# Patient Record
Sex: FEMALE | Race: ASIAN | NOT HISPANIC OR LATINO | URBAN - METROPOLITAN AREA
[De-identification: names, ages, dates, MRNs, and addresses within clinical notes are randomized per-mention and may not be internally consistent; named-entity substitution may affect disease eponyms.]

---

## 2020-12-30 ENCOUNTER — EMERGENCY (EMERGENCY)
Facility: HOSPITAL | Age: 20
LOS: 1 days | Discharge: HOME | End: 2020-12-30
Attending: EMERGENCY MEDICINE | Admitting: EMERGENCY MEDICINE
Payer: COMMERCIAL

## 2020-12-30 VITALS
SYSTOLIC BLOOD PRESSURE: 111 MMHG | TEMPERATURE: 98 F | HEART RATE: 95 BPM | OXYGEN SATURATION: 98 % | DIASTOLIC BLOOD PRESSURE: 55 MMHG | RESPIRATION RATE: 16 BRPM

## 2020-12-30 DIAGNOSIS — Z20.828 CONTACT WITH AND (SUSPECTED) EXPOSURE TO OTHER VIRAL COMMUNICABLE DISEASES: ICD-10-CM

## 2020-12-30 PROCEDURE — 99283 EMERGENCY DEPT VISIT LOW MDM: CPT

## 2020-12-30 NOTE — ED PROVIDER NOTE - PATIENT PORTAL LINK FT
You can access the FollowMyHealth Patient Portal offered by [facility] by registering at the following website: [email or web address]. By joining [employer]’s FollowMyHealth portal, you will also be able to view your health information using other applications (apps) compatible with our system.

## 2021-01-13 ENCOUNTER — EMERGENCY (EMERGENCY)
Facility: HOSPITAL | Age: 21
LOS: 1 days | Discharge: HOME | End: 2021-01-13
Admitting: EMERGENCY MEDICINE
Payer: COMMERCIAL

## 2021-01-13 VITALS
TEMPERATURE: 98 F | SYSTOLIC BLOOD PRESSURE: 122 MMHG | OXYGEN SATURATION: 98 % | DIASTOLIC BLOOD PRESSURE: 80 MMHG | HEART RATE: 83 BPM | RESPIRATION RATE: 16 BRPM

## 2021-01-13 DIAGNOSIS — Z20.822 CONTACT WITH AND (SUSPECTED) EXPOSURE TO COVID-19: ICD-10-CM

## 2021-01-13 PROCEDURE — 99283 EMERGENCY DEPT VISIT LOW MDM: CPT

## 2021-01-13 NOTE — ED PROVIDER NOTE - PATIENT PORTAL LINK FT
You can access the FollowMyHealth Patient Portal offered by [facility] by registering at the following website: [email or web address]. By joining [employer]’s FollowMyHealth portal, you will also be able to view your health information using other applications (apps) compatible with our system.

## 2021-01-13 NOTE — ED PROVIDER NOTE - OBJECTIVE STATEMENT
21 y/o female presents to the ED requesting COVID-19 testing. Patient is currently asymptomatic and has no other medical complaints at this time. Denies fever, chills, chest pain, SOB, cough. 21 y/o female presents to the ED requesting COVID-19 testing. Patient is currently asymptomatic and has no other medical complaints at this time. Denies fever, chills, chest pain, SOB, cough. Recently returned from [place].

## 2021-01-21 ENCOUNTER — EMERGENCY (EMERGENCY)
Facility: HOSPITAL | Age: 21
LOS: 1 days | Discharge: HOME | End: 2021-01-21
Attending: EMERGENCY MEDICINE | Admitting: EMERGENCY MEDICINE
Payer: COMMERCIAL

## 2021-01-21 VITALS
HEART RATE: 80 BPM | RESPIRATION RATE: 16 BRPM | DIASTOLIC BLOOD PRESSURE: 62 MMHG | OXYGEN SATURATION: 98 % | SYSTOLIC BLOOD PRESSURE: 134 MMHG | TEMPERATURE: 98 F

## 2021-01-21 DIAGNOSIS — Z20.822 CONTACT WITH AND (SUSPECTED) EXPOSURE TO COVID-19: ICD-10-CM

## 2021-01-21 PROCEDURE — 99283 EMERGENCY DEPT VISIT LOW MDM: CPT

## 2021-01-21 NOTE — ED PROVIDER NOTE - PATIENT PORTAL LINK FT
You can access the FollowMyHealth Patient Portal offered by [facility] by registering at the following website: [email or web address]. By joining [employer]’s FollowMyHealth portal, you will also be able to view your health information using other applications (apps) compatible with our system.

## 2021-01-21 NOTE — ED PROVIDER NOTE - OBJECTIVE STATEMENT
Patient presented requesting covid-19 testing. Patient asymptomatic - denies chest pain, dyspnea, fever, cough. denies recent travel or known exposure to covid-19 Patient presented requesting covid-19 testing. Pt reports having a + exposure, her boyfriend recently tested + for covid-19. Patient is asymptomatic - denies chest pain, dyspnea, fever, cough.

## 2021-01-21 NOTE — ED PROVIDER NOTE - NSFOLLOWUPINSTRUCTIONS_ED_ALL_ED_FT
Your test results may take 1-3 days. You will get a text/email.  Please check the patient online portal ([facility] and website) for results. You can create a portal account at [email or web address] [facility] [place]. If you have old records with [employer] or [facility]  or encounter any difficulties with us you will need to call the HELP line to merge results [phone] (Mon-Fri 8a-5p).    Please follow the instructions on provided coronavirus discharge educational forms and if needed self quarantine for 14 days.     If you test positive for COVID 19:    1. STAY HOME for 14 DAYS  2. Minimize Human contact to ONLY ESSENTIAL  3. Every time you wash your hands, sing the HAPPY BIRTHDAY Song so you know you're washing long enough.  Make sure to scrub the webspace between your fingers.  4. DRINK 1-3 Liters of fluids day x at least 5 days.  To remain hydrated. Your fatigue, lightheadedness, and body aches will decrease and your fever has a better chance of breaking if you are well hydrated.    5. For your Fever and Body aches takes Tylenol 650-100mg every 4-6h (max 4000mg/day). Try not to use ibuprofen, aspirin or naproxen (Advil, Motrin or Aleve) as these may worsen Coronavirus infection.  6. Use an inhaler for mild shortness of breath and cough  7. RETURN TO THE ER IMMEDIATELY IF YOU HAVE WORSENING SHORTNESS OF BREATH  8. TAKE THE FOLLOWING SUPPLEMENTS DAILY.        VITAMIN C 1000MG ONCE DAILY.        VITAMIN D 200IU ONCE DAILY.        ZINC 50MG ONCE DAILY.

## 2021-01-22 LAB — SARS-COV-2 RNA SPEC QL NAA+PROBE: SIGNIFICANT CHANGE UP
